# Patient Record
Sex: FEMALE | Race: WHITE | NOT HISPANIC OR LATINO | Employment: FULL TIME | URBAN - METROPOLITAN AREA
[De-identification: names, ages, dates, MRNs, and addresses within clinical notes are randomized per-mention and may not be internally consistent; named-entity substitution may affect disease eponyms.]

---

## 2021-06-11 ENCOUNTER — OFFICE VISIT (OUTPATIENT)
Dept: FAMILY MEDICINE | Facility: CLINIC | Age: 21
End: 2021-06-11
Payer: COMMERCIAL

## 2021-06-11 VITALS
WEIGHT: 127 LBS | BODY MASS INDEX: 21.16 KG/M2 | SYSTOLIC BLOOD PRESSURE: 110 MMHG | TEMPERATURE: 99 F | HEIGHT: 65 IN | OXYGEN SATURATION: 100 % | HEART RATE: 91 BPM | DIASTOLIC BLOOD PRESSURE: 73 MMHG

## 2021-06-11 DIAGNOSIS — J20.9 ACUTE BRONCHITIS, UNSPECIFIED ORGANISM: Primary | ICD-10-CM

## 2021-06-11 DIAGNOSIS — R06.02 SHORTNESS OF BREATH: ICD-10-CM

## 2021-06-11 PROCEDURE — 94640 AIRWAY INHALATION TREATMENT: CPT

## 2021-06-11 PROCEDURE — 99204 OFFICE O/P NEW MOD 45 MIN: CPT | Mod: 25

## 2021-06-11 RX ORDER — AZITHROMYCIN 250 MG/1
250 TABLET, FILM COATED ORAL DAILY
Qty: 6 TABLET | Refills: 0 | Status: SHIPPED | OUTPATIENT
Start: 2021-06-11 | End: 2021-06-16

## 2021-06-11 RX ORDER — LEVOTHYROXINE SODIUM 25 UG/1
25 TABLET ORAL
COMMUNITY
Start: 2021-01-25 | End: 2022-01-25

## 2021-06-11 RX ORDER — ALBUTEROL SULFATE 90 UG/1
2 AEROSOL, METERED RESPIRATORY (INHALATION) EVERY 6 HOURS PRN
Qty: 1 G | Refills: 0 | Status: SHIPPED | OUTPATIENT
Start: 2021-06-11 | End: 2021-07-28

## 2021-06-11 RX ORDER — IPRATROPIUM BROMIDE AND ALBUTEROL SULFATE 2.5; .5 MG/3ML; MG/3ML
3 SOLUTION RESPIRATORY (INHALATION) ONCE
Status: COMPLETED | OUTPATIENT
Start: 2021-06-11 | End: 2021-06-11

## 2021-06-11 RX ORDER — SERTRALINE HYDROCHLORIDE 100 MG/1
200 TABLET, FILM COATED ORAL
COMMUNITY
Start: 2021-01-25 | End: 2022-01-25

## 2021-06-11 RX ADMIN — IPRATROPIUM BROMIDE AND ALBUTEROL SULFATE 3 ML: 2.5; .5 SOLUTION RESPIRATORY (INHALATION) at 14:42

## 2021-06-11 ASSESSMENT — MIFFLIN-ST. JEOR: SCORE: 1334.01

## 2021-06-11 NOTE — PROGRESS NOTES
"Assessment & Plan     Acute bronchitis, unspecified organism  **  - azithromycin (ZITHROMAX) 250 MG tablet  Dispense: 6 tablet; Refill: 0  - albuterol (PROAIR HFA/PROVENTIL HFA/VENTOLIN HFA) 108 (90 Base) MCG/ACT inhaler  Dispense: 1 g; Refill: 0    Shortness of breath  **  - ipratropium - albuterol 0.5 mg/2.5 mg/3 mL (DUONEB) neb solution 3 mL with subjective improvement of dyspnea      Return in about 5 days (around 6/16/2021) for If not better, sooner if worsening.    Ingrid Carson PA-C  [facility]    Subjective     Laura is a 21 year old female who presents to clinic today for the following health issues:  Chief Complaint   Patient presents with     Cough     difficulty breathing. Congestion, for about 2 weeks. Lung hurt started a couple days ago. Covid test 2 weeks ago (NEG) and strep (NEG). 1st covid vaccine completed.      HPI  Two weeks ago she had a cold which improved and is now worse. Yesterday she started having trouble breathing.   Feels like she needs an inhaler which she's used for pneumonia in the past.   Temp of 100 a couple days ago.   Cough- sometimes productive. Mucus is dark/green brown in the morning.   She is having pain around her diaphragm and described her chest as feeling tight.     No hx asthma.     Negative covid and strep two weeks.   Had her first dose in April    Review of Systems  Constitutional, HEENT, cardiovascular, pulmonary, gi and gu systems are negative, except as otherwise noted.      Objective    /73   Pulse 91   Temp 99  F (37.2  C) (Oral)   Ht 1.638 m (5' 4.5\")   Wt 57.6 kg (127 lb)   LMP  (LMP Unknown)   SpO2 100%   Breastfeeding No   BMI 21.46 kg/m    Physical Exam   GENERAL: healthy, alert and no distress  NECK: no adenopathy, no asymmetry, masses, or scars and thyroid normal to palpation  RESP: lungs clear to auscultation - no rales, rhonchi or wheezes  CV: regular " rate and rhythm, normal S1 S2, no S3 or S4, no murmur, click or rub, no peripheral edema and peripheral pulses strong  ABDOMEN: soft, nontender, no hepatosplenomegaly, no masses and bowel sounds normal  MS: no gross musculoskeletal defects noted, no edema    Patient declined cxr at this time as it wont change the treatment plan.

## 2021-06-11 NOTE — PATIENT INSTRUCTIONS
Patient Education     Acute Bronchitis  Your healthcare provider has told you that you have acute bronchitis. Bronchitis is infection or inflammation of the airways in the lungs (bronchial tubes). Normally, air moves easily in and out of the airways. Bronchitis narrows the airways. This makes it harder for air to flow in and out of the lungs. This causes symptoms such as shortness of breath, coughing up yellow or green mucus, and wheezing.  Bronchitis can be acute or chronic. Acute means it happens quickly and goes away in a short time. Chronic means a condition lasts a long time and often comes back. Most people with acute bronchitis get better in 1 to 2 weeks.     What causes acute bronchitis?  Acute bronchitis is often caused by a virus such as a cold or the flu. In some cases, it may be caused by bacteria. Certain factors make it more likely for a cold or flu to turn into bronchitis. These include being very young, being elderly, having a heart or lung problem, or having a weak immune system. Cigarette smoking also makes bronchitis more likely.  When bronchitis develops, the airways become swollen. The airways may also become infected with bacteria. This is known as a secondary infection.  Symptoms of acute bronchitis  Symptoms can include:    Coughing with mucus    Wheezing    Feeling short of breath    Chest pain    Fever  Diagnosing acute bronchitis  Your healthcare provider will ask about your symptoms and health history. He or she will give you a physical exam. This will include listening to your lungs while you breathe. You may have a chest X-ray to look for infection in the lungs (pneumonia) if you have had a fever. You may also have blood tests to check for infection.  Treating acute bronchitis  Bronchitis usually goes away in 1 to 2 weeks without treatment. You can help feel better by:    Taking medicine as directed. Talk to your healthcare provider before taking any over-the-counter medicines (OTC).  Some OTC medicines help relieve inflammation in your bronchial tubes. They can also thin mucus. This makes it easier to cough up. Your healthcare provider may prescribe an inhaler to help open up the bronchial tubes. Most of the time, acute bronchitis is caused by a viral infection. Antibiotics are usually not prescribed for viral infections.    Drinking plenty of fluids, such as water, juice, or warm soup. Fluids loosen mucus so that you can cough it up. This helps you breathe more easily. Fluids also prevent dehydration.    Using a humidifier. This can help reduce coughing.    Getting plenty of rest    Not smoking. Also, don't let anyone else smoke in your home. In public places, move away from secondhand smoke.  Recovery and follow-up  Follow up with your doctor. You will likely feel better in 1 to 2 weeks. But you may have a dry cough for a longer time. Let your doctor know if you still have symptoms other than a dry cough after 2 weeks. Tell him or her if you get bronchial infections often.  Self-care tips  To get relief from your symptoms and prevent bronchitis:    Stop smoking. Stopping smoking is the most important step you can take to treat bronchitis. If you need help stopping smoking, talk with your healthcare provider.    Stay away from secondhand smoke and other irritants. Try to stay away from smoke, chemicals, fumes, and dust. Don t let anyone smoke in your home. Stay indoors on smoggy days.    Prevent lung infections. Ask your healthcare provider about the flu and pneumonia vaccines. Take steps to prevent colds and other lung infections.    Wash your hands well. Wash your hands often with soap and water. Use hand  when you can t wash your hands. Stay away from crowds during cold and flu season.  When to call your healthcare provider  Call the healthcare provider if you have any of these:    Fever of 100.4 F ( 38.0 C) or higher, or as directed by your healthcare provider    Symptoms that get  worse, or new symptoms    Breathing not getting better with treatments    Symptoms that don t start to get better in 1 week  [employer] last reviewed this educational content on 8/1/2019 2000-2021 [employer]. All rights reserved. This information is not intended as a substitute for professional medical care. Always follow your healthcare professional's instructions.

## 2021-06-25 ENCOUNTER — OFFICE VISIT (OUTPATIENT)
Dept: FAMILY MEDICINE | Facility: CLINIC | Age: 21
End: 2021-06-25
Payer: COMMERCIAL

## 2021-06-25 VITALS
BODY MASS INDEX: 22.2 KG/M2 | SYSTOLIC BLOOD PRESSURE: 115 MMHG | RESPIRATION RATE: 14 BRPM | OXYGEN SATURATION: 100 % | DIASTOLIC BLOOD PRESSURE: 78 MMHG | HEART RATE: 68 BPM | TEMPERATURE: 98.6 F | WEIGHT: 130 LBS | HEIGHT: 64 IN

## 2021-06-25 DIAGNOSIS — H66.91 RIGHT ACUTE OTITIS MEDIA: Primary | ICD-10-CM

## 2021-06-25 PROCEDURE — 99213 OFFICE O/P EST LOW 20 MIN: CPT

## 2021-06-25 RX ORDER — OFLOXACIN 3 MG/ML
10 SOLUTION AURICULAR (OTIC) 2 TIMES DAILY
Qty: 10 ML
Start: 2021-06-25 | End: 2021-07-05

## 2021-06-25 ASSESSMENT — MIFFLIN-ST. JEOR: SCORE: 1339.68

## 2021-06-25 ASSESSMENT — ENCOUNTER SYMPTOMS
RHINORRHEA: 0
SORE THROAT: 0
CHILLS: 0
HEADACHES: 0
CHEST TIGHTNESS: 0
COUGH: 0
FATIGUE: 0
DIZZINESS: 0
WHEEZING: 0
SHORTNESS OF BREATH: 0
FEVER: 0

## 2021-06-25 NOTE — PATIENT INSTRUCTIONS
Use ear drops as directed. Avoid water to the area including pools, hot tubs, and use cotton ball in ear when showering until done antibiotic. Tylenol and Ibuprofen for pain as needed. Avoid q tips.

## 2021-06-25 NOTE — PROGRESS NOTES
"    Assessment & Plan   Problem List Items Addressed This Visit     None      Visit Diagnoses     Right acute otitis media    -  Primary         Because patient was recently on antibiotic orally and reported upset stomach, she requested drops. Patient given Ofloxacin, 10 drops BID x 10 days. Alternate Tylenol and Ibuprofen as needed. Avoid fluid to ear.       Tobacco Cessation:   reports that she has been smoking cigarettes. She started smoking about 3 years ago. She has a 3.00 pack-year smoking history. She has never used smokeless tobacco.    Return in about 1 week (around 7/2/2021), or if symptoms worsen or fail to improve, for with any available provider.    [facility]    Subjective   Laura is a 21 year old who presents for the following health issues    Laura presents to  with reports of left ear pain x 1 week. She states she has had pressure with decreased hearing as well as pain up to 7/10 with no radiation. Pain is described as aching, throbbing. She has tried ear drops which offered minimal relief. She reports she was given Z pack for Bronchitis at the beginning of June which she completed. She denies fever, nausea, drainage from ear. She does reports history of recurrent ear infections as child requiring ear tube placement.            Review of Systems   Constitutional: Negative for chills, fatigue and fever.   HENT: Positive for ear pain and hearing loss. Negative for congestion, ear discharge, rhinorrhea, sneezing and sore throat.    Respiratory: Negative for cough, chest tightness, shortness of breath and wheezing.    Cardiovascular: Negative for chest pain.   Allergic/Immunologic: Negative for environmental allergies.   Neurological: Negative for dizziness and headaches.            Objective    /78   Pulse 68   Temp 98.6  F (37  C) (Oral)   Resp 14   Ht 1.626 m (5' 4\")   Wt 59 kg (130 lb)   LMP  (LMP " Unknown)   SpO2 100%   BMI 22.31 kg/m    Body mass index is 22.31 kg/m .  Physical Exam  Constitutional:       Appearance: Normal appearance. She is normal weight.   HENT:      Head: Normocephalic and atraumatic.      Left Ear: Tympanic membrane normal.      Ears:      Comments: Right ear erythematous canal with serous fluid posterior to TM     Nose: Nose normal.      Mouth/Throat:      Mouth: Mucous membranes are moist.   Eyes:      Extraocular Movements: Extraocular movements intact.      Conjunctiva/sclera: Conjunctivae normal.      Pupils: Pupils are equal, round, and reactive to light.   Neck:      Musculoskeletal: Neck supple.   Cardiovascular:      Rate and Rhythm: Normal rate and regular rhythm.   Pulmonary:      Effort: No respiratory distress.      Breath sounds: Normal breath sounds. No stridor. No wheezing or rhonchi.   Neurological:      Mental Status: She is alert and oriented to person, place, and time.   Psychiatric:         Mood and Affect: Mood normal.         Behavior: Behavior normal.

## 2021-07-27 ENCOUNTER — NURSE TRIAGE (OUTPATIENT)
Dept: INTERNAL MEDICINE | Facility: CLINIC | Age: 21
End: 2021-07-27

## 2021-07-27 NOTE — TELEPHONE ENCOUNTER
Pt stated that she smokes weed and vapes weed as with she snorted cocaine last weekend.     She also has been having dry scaly patches of skin on her breasts, neck, one on stomach,  One on back and two spots on right leg.    They don't itch, no pain noted. No oozing  -     Have tried lotion on the spots and it is not working on making it clear up -     RN advised that pt should be seen - pt stated that she cannot be seen today due to working and she cannot leave     RN reviewed worsening symptoms and if they were to happen to go to the ER - but if nothing changes to be seen at scheduled appointment tomorrow     Advised pt to not smoke too    Patient stated an understanding and agreed with plan.    Cady Kemp RN, BSN  [facility]        Reason for Disposition    Chest pain(s) lasting a few seconds persists > 3 days    Additional Information    Negative: Severe difficulty breathing (e.g., struggling for each breath, speaks in single words)    Negative: Passed out (i.e., fainted, collapsed and was not responding)    Negative: Difficult to awaken or acting confused (e.g., disoriented, slurred speech)    Negative: Shock suspected (e.g., cold/pale/clammy skin, too weak to stand, low BP, rapid pulse)    Negative: Chest pain lasting longer than 5 minutes and ANY of the following:* Over 45 years old* Over 30 years old and at least one cardiac risk factor (e.g., diabetes, high blood pressure, high cholesterol, smoker, or strong family history of heart disease)* History of heart disease (i.e., angina, heart attack, heart failure, bypass surgery, takes nitroglycerin)* Pain is crushing, pressure-like, or heavy    Negative: Heart beating < 50 beats per minute OR > 140 beats per minute    Negative: Visible sweat on face or sweat dripping down face    Negative: Sounds like a life-threatening emergency to the triager    Negative: Followed an injury to chest    Negative: SEVERE chest pain    Negative:  "Pain also in shoulder(s) or arm(s) or jaw    Negative: Difficulty breathing    Negative: Cocaine use within last 3 days    Negative: Major surgery in the past month    Negative: Hip or leg fracture (broken bone) in past month (or had cast on leg or ankle in past month)    Negative: Illness requiring prolonged bedrest in past month (e.g., immobilization, long hospital stay)    Negative: Long-distance travel in past month (e.g., car, bus, train, plane; with trip lasting 6 or more hours)    Negative: History of prior 'blood clot' in leg or lungs (i.e., deep vein thrombosis, pulmonary embolism)    Negative: History of inherited increased risk of blood clots (e.g., Factor 5 Leiden, Anti-thrombin 3, Protein C or Protein S deficiency, Prothrombin mutation)    Negative: Heart beating irregularly or very rapidly    Negative: Chest pain lasting longer than 5 minutes and occurred in last 3 days (72 hours) (Exception: feels exactly the same as previously diagnosed heartburn and has accompanying sour taste in mouth)    Negative: Chest pain or 'angina' comes and goes and is happening more often (increasing in frequency) or getting worse (increasing in severity) (Exception: chest pains that last only a few seconds)    Negative: Dizziness or lightheadedness    Negative: Coughing up blood    Negative: Patient sounds very sick or weak to the triager    Negative: Cancer treatment in the past two months (or has cancer now)    Negative: Patient says chest pain feels exactly the same as previously diagnosed 'heartburn'  and  describes burning in chest and accompanying sour taste in mouth    Negative: Fever > 100.4 F (38.0 C)    Answer Assessment - Initial Assessment Questions  1. LOCATION: \"Where does it hurt?\"        Low rib cage on both sides mainly though on left     2. RADIATION: \"Does the pain go anywhere else?\" (e.g., into neck, jaw, arms, back)      None     3. ONSET: \"When did the chest pain begin?\" (Minutes, hours or days)       " "Couple of weeks     4. PATTERN \"Does the pain come and go, or has it been constant since it started?\"  \"Does it get worse with exertion?\"       The pain is there when she is smoking but its not there when she is not smoking.   Denies: increased pain with exertion     5. DURATION: \"How long does it last\" (e.g., seconds, minutes, hours)      When she is smoking     6. SEVERITY: \"How bad is the pain?\"  (e.g., Scale 1-10; mild, moderate, or severe)     - MILD (1-3): doesn't interfere with normal activities      - MODERATE (4-7): interferes with normal activities or awakens from sleep     - SEVERE (8-10): excruciating pain, unable to do any normal activities        Moderate     7. CARDIAC RISK FACTORS: \"Do you have any history of heart problems or risk factors for heart disease?\" (e.g., angina, prior heart attack; diabetes, high blood pressure, high cholesterol, smoker, or strong family history of heart disease)      Denies family history, but pt has an eating disorder as with is a smoker     8. PULMONARY RISK FACTORS: \"Do you have any history of lung disease?\"  (e.g., blood clots in lung, asthma, emphysema, birth control pills)      Pt has an IUD   No lung disease hx     9. CAUSE: \"What do you think is causing the chest pain?\"      Smoking..... and has a hx of drug use ( cocaine ) did it last week she snorted it     10. OTHER SYMPTOMS: \"Do you have any other symptoms?\" (e.g., dizziness, nausea, vomiting, sweating, fever, difficulty breathing, cough)        Denies: dizziness, N/V, SOB, sweating, cough    11. PREGNANCY: \"Is there any chance you are pregnant?\" \"When was your last menstrual period?\"        NA    Protocols used: CHEST PAIN-A-OH      "

## 2021-07-28 ENCOUNTER — OFFICE VISIT (OUTPATIENT)
Dept: INTERNAL MEDICINE | Facility: CLINIC | Age: 21
End: 2021-07-28
Payer: COMMERCIAL

## 2021-07-28 VITALS
DIASTOLIC BLOOD PRESSURE: 77 MMHG | TEMPERATURE: 98.4 F | BODY MASS INDEX: 21.46 KG/M2 | HEIGHT: 65 IN | OXYGEN SATURATION: 99 % | WEIGHT: 128.8 LBS | SYSTOLIC BLOOD PRESSURE: 111 MMHG | RESPIRATION RATE: 20 BRPM | HEART RATE: 98 BPM

## 2021-07-28 DIAGNOSIS — L42 PITYRIASIS ROSEA: ICD-10-CM

## 2021-07-28 DIAGNOSIS — R07.9 CHEST PAIN, UNSPECIFIED TYPE: Primary | ICD-10-CM

## 2021-07-28 DIAGNOSIS — F41.9 ANXIETY: ICD-10-CM

## 2021-07-28 DIAGNOSIS — R21 RASH: ICD-10-CM

## 2021-07-28 PROCEDURE — 99213 OFFICE O/P EST LOW 20 MIN: CPT | Performed by: FAMILY MEDICINE

## 2021-07-28 PROCEDURE — 96127 BRIEF EMOTIONAL/BEHAV ASSMT: CPT | Performed by: FAMILY MEDICINE

## 2021-07-28 ASSESSMENT — ANXIETY QUESTIONNAIRES
3. WORRYING TOO MUCH ABOUT DIFFERENT THINGS: NEARLY EVERY DAY
1. FEELING NERVOUS, ANXIOUS, OR ON EDGE: NEARLY EVERY DAY
IF YOU CHECKED OFF ANY PROBLEMS ON THIS QUESTIONNAIRE, HOW DIFFICULT HAVE THESE PROBLEMS MADE IT FOR YOU TO DO YOUR WORK, TAKE CARE OF THINGS AT HOME, OR GET ALONG WITH OTHER PEOPLE: SOMEWHAT DIFFICULT
6. BECOMING EASILY ANNOYED OR IRRITABLE: MORE THAN HALF THE DAYS
5. BEING SO RESTLESS THAT IT IS HARD TO SIT STILL: SEVERAL DAYS
2. NOT BEING ABLE TO STOP OR CONTROL WORRYING: NEARLY EVERY DAY
GAD7 TOTAL SCORE: 16
7. FEELING AFRAID AS IF SOMETHING AWFUL MIGHT HAPPEN: SEVERAL DAYS

## 2021-07-28 ASSESSMENT — MIFFLIN-ST. JEOR: SCORE: 1342.17

## 2021-07-28 ASSESSMENT — PATIENT HEALTH QUESTIONNAIRE - PHQ9
SUM OF ALL RESPONSES TO PHQ QUESTIONS 1-9: 20
5. POOR APPETITE OR OVEREATING: NEARLY EVERY DAY

## 2021-07-28 NOTE — PROGRESS NOTES
"    (R07.9) Chest pain, unspecified type  (primary encounter diagnosis)  Comment:   Possible causes -anxiety and muscle tension, the vaping of various substances which she is doing.  Plan:   Advised to discuss with her psychiatrist at upcoming appointment regarding anxiety.  Advised against the vaping.      (F41.9) Anxiety  Comment:   See above.  She acknowledges some anxiety symptoms.  Plan:       (R21) Rash  Comment:   Present for 2 weeks.  Plan: See below.    (L42) Pityriasis rosea  Comment:   Advised.  Plan:   For now try 1% HC and sunlight as her case is mild.  Have a recheck if worsening.        Subjective     Concerns about rash on her body.    Pain lower chest.    HPI     Rash trunk and right leg for 1 1/2 weeks.  Rash is not especially pruritic.    Also complains of some tightness across lower chest which is not related to exertion.    She has been a smoker and she does vaping including with THC.  Street drugs.  Has a cough.    Has a history of anxiety.  Once was on sertraline and stopped this.  Does not necessarily want to restart.  She has a therapist.  She also has been referred to a psychiatrist and has an upcoming appointment.        Review of Systems     No fevers or chills.  No loss of taste or smell.  No wheeze or S OB.  No palpitations.  No edema.  No nausea, vomiting or diarrhea.      SOCIAL HISTORY    Works in a [employer] store.  Lives with boyfriend.          Objective    /77 (BP Location: Right arm, Patient Position: Chair, Cuff Size: Adult Regular)   Pulse 98   Temp 98.4  F (36.9  C) (Oral)   Resp 20   Ht 1.638 m (5' 4.5\")   Wt 58.4 kg (128 lb 12.8 oz)   LMP 07/21/2021   SpO2 99%   BMI 21.77 kg/m    There is no height or weight on file to calculate BMI.  Physical Exam     In general, well-appearing woman, NAD.  HEENT unremarkable.  Neck no thyromegaly.  Chest wall nontender, lungs clear  Cardiac RSR without murmur or rub  Abdomen nontender  Skin has some scattered patches minimally " scaly, salmon-colored.  Consistent with GA.  May have started on left breast.  Mildly anxious.  Thoughts linear.

## 2021-07-29 ASSESSMENT — ANXIETY QUESTIONNAIRES: GAD7 TOTAL SCORE: 16
